# Patient Record
Sex: MALE | Race: WHITE | NOT HISPANIC OR LATINO | URBAN - METROPOLITAN AREA
[De-identification: names, ages, dates, MRNs, and addresses within clinical notes are randomized per-mention and may not be internally consistent; named-entity substitution may affect disease eponyms.]

---

## 2018-12-03 ENCOUNTER — AMBULATORY SURGICAL CENTER (OUTPATIENT)
Dept: URBAN - METROPOLITAN AREA SURGERY 17 | Facility: SURGERY | Age: 68
End: 2018-12-03
Payer: COMMERCIAL

## 2018-12-03 ENCOUNTER — OFFICE (OUTPATIENT)
Dept: URBAN - METROPOLITAN AREA PATHOLOGY 4 | Facility: PATHOLOGY | Age: 68
End: 2018-12-03

## 2018-12-03 VITALS
SYSTOLIC BLOOD PRESSURE: 114 MMHG | DIASTOLIC BLOOD PRESSURE: 120 MMHG | TEMPERATURE: 98.2 F | SYSTOLIC BLOOD PRESSURE: 151 MMHG | SYSTOLIC BLOOD PRESSURE: 118 MMHG | OXYGEN SATURATION: 86 % | SYSTOLIC BLOOD PRESSURE: 133 MMHG | DIASTOLIC BLOOD PRESSURE: 77 MMHG | DIASTOLIC BLOOD PRESSURE: 95 MMHG | DIASTOLIC BLOOD PRESSURE: 62 MMHG | SYSTOLIC BLOOD PRESSURE: 145 MMHG | HEART RATE: 83 BPM | OXYGEN SATURATION: 96 % | DIASTOLIC BLOOD PRESSURE: 79 MMHG | RESPIRATION RATE: 29 BRPM | OXYGEN SATURATION: 87 % | DIASTOLIC BLOOD PRESSURE: 75 MMHG | SYSTOLIC BLOOD PRESSURE: 156 MMHG | RESPIRATION RATE: 21 BRPM | SYSTOLIC BLOOD PRESSURE: 204 MMHG | HEART RATE: 75 BPM | DIASTOLIC BLOOD PRESSURE: 73 MMHG | RESPIRATION RATE: 14 BRPM | HEART RATE: 77 BPM | OXYGEN SATURATION: 85 % | RESPIRATION RATE: 16 BRPM | RESPIRATION RATE: 28 BRPM | OXYGEN SATURATION: 83 % | SYSTOLIC BLOOD PRESSURE: 107 MMHG | HEART RATE: 86 BPM | HEART RATE: 76 BPM | SYSTOLIC BLOOD PRESSURE: 160 MMHG | HEART RATE: 82 BPM | DIASTOLIC BLOOD PRESSURE: 74 MMHG | SYSTOLIC BLOOD PRESSURE: 136 MMHG | HEART RATE: 80 BPM | DIASTOLIC BLOOD PRESSURE: 58 MMHG | RESPIRATION RATE: 18 BRPM | DIASTOLIC BLOOD PRESSURE: 83 MMHG | OXYGEN SATURATION: 95 % | TEMPERATURE: 96.7 F | DIASTOLIC BLOOD PRESSURE: 68 MMHG | WEIGHT: 315 LBS | HEART RATE: 73 BPM | RESPIRATION RATE: 20 BRPM | DIASTOLIC BLOOD PRESSURE: 71 MMHG | RESPIRATION RATE: 23 BRPM | DIASTOLIC BLOOD PRESSURE: 69 MMHG | OXYGEN SATURATION: 91 % | HEART RATE: 93 BPM | HEIGHT: 73 IN | SYSTOLIC BLOOD PRESSURE: 141 MMHG | SYSTOLIC BLOOD PRESSURE: 128 MMHG | RESPIRATION RATE: 25 BRPM

## 2018-12-03 DIAGNOSIS — K63.5 POLYP OF COLON: ICD-10-CM

## 2018-12-03 DIAGNOSIS — K57.30 DIVERTICULOSIS OF LARGE INTESTINE WITHOUT PERFORATION OR ABS: ICD-10-CM

## 2018-12-03 DIAGNOSIS — D17.79 BENIGN LIPOMATOUS NEOPLASM OF OTHER SITES: ICD-10-CM

## 2018-12-03 DIAGNOSIS — Z12.11 ENCOUNTER FOR SCREENING FOR MALIGNANT NEOPLASM OF COLON: ICD-10-CM

## 2018-12-03 DIAGNOSIS — K62.1 RECTAL POLYP: ICD-10-CM

## 2018-12-03 DIAGNOSIS — D12.3 BENIGN NEOPLASM OF TRANSVERSE COLON: ICD-10-CM

## 2018-12-03 DIAGNOSIS — D12.4 BENIGN NEOPLASM OF DESCENDING COLON: ICD-10-CM

## 2018-12-03 DIAGNOSIS — K64.1 SECOND DEGREE HEMORRHOIDS: ICD-10-CM

## 2018-12-03 PROBLEM — D12.0 BENIGN NEOPLASM OF CECUM: Status: ACTIVE | Noted: 2018-12-03

## 2018-12-03 PROBLEM — Z80.0 FAMILY HISTORY OF COLON CANCER: Status: ACTIVE | Noted: 2018-12-03

## 2018-12-03 PROBLEM — D12.2 BENIGN NEOPLASM OF ASCENDING COLON: Status: ACTIVE | Noted: 2018-12-03

## 2018-12-03 LAB
GI HISTOLOGY: A. UNSPECIFIED: (no result)
GI HISTOLOGY: B. UNSPECIFIED: (no result)
GI HISTOLOGY: C. UNSPECIFIED: (no result)
GI HISTOLOGY: D. UNSPECIFIED: (no result)
GI HISTOLOGY: PDF REPORT: (no result)

## 2018-12-03 PROCEDURE — 88305 TISSUE EXAM BY PATHOLOGIST: CPT | Performed by: INTERNAL MEDICINE

## 2018-12-03 PROCEDURE — 45385 COLONOSCOPY W/LESION REMOVAL: CPT | Mod: PT | Performed by: INTERNAL MEDICINE

## 2018-12-21 ENCOUNTER — ON CAMPUS - OUTPATIENT (OUTPATIENT)
Dept: URBAN - METROPOLITAN AREA HOSPITAL 108 | Facility: HOSPITAL | Age: 68
End: 2018-12-21
Payer: COMMERCIAL

## 2018-12-21 DIAGNOSIS — K57.30 DIVERTICULOSIS OF LARGE INTESTINE WITHOUT PERFORATION OR ABS: ICD-10-CM

## 2018-12-21 DIAGNOSIS — K63.5 POLYP OF COLON: ICD-10-CM

## 2018-12-21 DIAGNOSIS — D12.0 BENIGN NEOPLASM OF CECUM: ICD-10-CM

## 2018-12-21 DIAGNOSIS — D17.5 BENIGN LIPOMATOUS NEOPLASM OF INTRA-ABDOMINAL ORGANS: ICD-10-CM

## 2018-12-21 PROCEDURE — 45390 COLONOSCOPY W/RESECTION: CPT

## 2019-11-25 VITALS
DIASTOLIC BLOOD PRESSURE: 71 MMHG | HEART RATE: 80 BPM | OXYGEN SATURATION: 95 % | TEMPERATURE: 96.7 F | HEART RATE: 86 BPM | SYSTOLIC BLOOD PRESSURE: 126 MMHG | HEART RATE: 84 BPM | HEART RATE: 90 BPM | RESPIRATION RATE: 20 BRPM | RESPIRATION RATE: 16 BRPM | DIASTOLIC BLOOD PRESSURE: 128 MMHG | OXYGEN SATURATION: 87 % | OXYGEN SATURATION: 90 % | SYSTOLIC BLOOD PRESSURE: 141 MMHG | HEIGHT: 73 IN | SYSTOLIC BLOOD PRESSURE: 160 MMHG | HEART RATE: 81 BPM | DIASTOLIC BLOOD PRESSURE: 81 MMHG | OXYGEN SATURATION: 92 % | DIASTOLIC BLOOD PRESSURE: 142 MMHG | RESPIRATION RATE: 14 BRPM | OXYGEN SATURATION: 94 % | SYSTOLIC BLOOD PRESSURE: 162 MMHG | DIASTOLIC BLOOD PRESSURE: 83 MMHG | HEART RATE: 82 BPM | WEIGHT: 315 LBS | HEART RATE: 87 BPM | TEMPERATURE: 99 F | SYSTOLIC BLOOD PRESSURE: 131 MMHG | OXYGEN SATURATION: 96 % | RESPIRATION RATE: 30 BRPM | DIASTOLIC BLOOD PRESSURE: 75 MMHG | SYSTOLIC BLOOD PRESSURE: 134 MMHG

## 2019-12-02 ENCOUNTER — AMBULATORY SURGICAL CENTER (OUTPATIENT)
Dept: URBAN - METROPOLITAN AREA SURGERY 17 | Facility: SURGERY | Age: 69
End: 2019-12-02
Payer: COMMERCIAL

## 2019-12-02 DIAGNOSIS — K57.30 DIVERTICULOSIS OF LARGE INTESTINE WITHOUT PERFORATION OR ABS: ICD-10-CM

## 2019-12-02 DIAGNOSIS — Z86.010 PERSONAL HISTORY OF COLONIC POLYPS: ICD-10-CM

## 2019-12-02 DIAGNOSIS — Z09 ENCOUNTER FOR FOLLOW-UP EXAMINATION AFTER COMPLETED TREATMEN: ICD-10-CM

## 2019-12-02 PROCEDURE — G0105 COLORECTAL SCRN; HI RISK IND: HCPCS | Performed by: INTERNAL MEDICINE

## 2019-12-02 NOTE — SERVICENOTES
Patient understands associated risk, benefit of endoscopy including bleeding, infection, missed polyp or missed cancer, perforation, missed lesion, death. Withdrawal time was > / = 6 minutes.
Mild O2 desat during procedure, requiring chin lift w/prompt response.

## 2019-12-02 NOTE — SERVICEHPINOTES
12/2/2019  This is an established patient in the practice who presents for a "bypass" colonoscopy for purposes of polyp surveillance.  EMR of  30 mm polyp TVA with HGD 2018BRUpdated new patient forms reviewed.BRPre-operatively, I reviewed the indication(s) for the procedure, the risks of the procedure [including but not limited to: unexpected bleeding possibly requiring hospitalization and/or an unplanned repeat colonoscopy, perforation possibly requiring surgical treatment, missed lesions and complications of sedation/MAC [also explained by anesthesia staff]. Since this patient has had prior colonoscopic and/or endoscopic procedures, these risks were familiar to the patient.  Multiple opportunities were provided for any questions or concerns, and all questions were answered satisfactorily before any anesthesia administered.